# Patient Record
(demographics unavailable — no encounter records)

---

## 2024-10-18 NOTE — HISTORY OF PRESENT ILLNESS
[TextBox_4] : CLOVER NORRIS is a 64 year female is for pap  abnormal pap   Pt without bleeding.  Pt seen internist and had labs.  Discussed mammogram and colonoscopy.

## 2024-10-18 NOTE — PLAN
[FreeTextEntry1] : CLOVER NORRIS is a 64 year female for pap    Pt with regular cycles.  No irregular bleeding.  Discussed Mammogram and colonoscopy.